# Patient Record
Sex: FEMALE | Race: OTHER
[De-identification: names, ages, dates, MRNs, and addresses within clinical notes are randomized per-mention and may not be internally consistent; named-entity substitution may affect disease eponyms.]

---
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
## 2019-04-04 ENCOUNTER — HOSPITAL ENCOUNTER (EMERGENCY)
Dept: HOSPITAL 72 - EMR | Age: 39
Discharge: HOME | End: 2019-04-04
Payer: MEDICAID

## 2019-04-04 VITALS — DIASTOLIC BLOOD PRESSURE: 90 MMHG | SYSTOLIC BLOOD PRESSURE: 145 MMHG

## 2019-04-04 VITALS — HEIGHT: 60 IN | WEIGHT: 165 LBS | BODY MASS INDEX: 32.39 KG/M2

## 2019-04-04 VITALS — SYSTOLIC BLOOD PRESSURE: 145 MMHG | DIASTOLIC BLOOD PRESSURE: 90 MMHG

## 2019-04-04 DIAGNOSIS — N76.0: ICD-10-CM

## 2019-04-04 DIAGNOSIS — N39.0: Primary | ICD-10-CM

## 2019-04-04 LAB
APPEARANCE UR: CLEAR
APTT PPP: (no result) S
GLUCOSE UR STRIP-MCNC: NEGATIVE MG/DL
KETONES UR QL STRIP: NEGATIVE
LEUKOCYTE ESTERASE UR QL STRIP: (no result)
NITRITE UR QL STRIP: NEGATIVE
PH UR STRIP: 5 [PH] (ref 4.5–8)
PROT UR QL STRIP: NEGATIVE
SP GR UR STRIP: 1.02 (ref 1–1.03)
UROBILINOGEN UR-MCNC: NORMAL MG/DL (ref 0–1)

## 2019-04-04 PROCEDURE — 99283 EMERGENCY DEPT VISIT LOW MDM: CPT

## 2019-04-04 PROCEDURE — 81003 URINALYSIS AUTO W/O SCOPE: CPT

## 2019-04-04 PROCEDURE — 81025 URINE PREGNANCY TEST: CPT

## 2019-04-04 NOTE — EMERGENCY ROOM REPORT
History of Present Illness


General


Chief Complaint:  Female Urogenital Problems


Source:  Patient





Present Illness


HPI


Patient presents with complaints of vaginal discharge increased redness and 

discomfort in the external vaginal area as well





Reports that she had taken some antibiotics about a month ago for her throat





More recently she has had increased vaginal discharge or foul smell


Denies any other abdominal pain denies any fevers or chills she has noticed 

increased erythema as well in the external vaginal region





Patient reports that she had douched, and also taken over-the-counter 

medication without much improvement





Patient reports that she has been sexually active with one partner however does 

not feel that she has an STD


Allergies:  


Coded Allergies:  


     No Known Allergies (Unverified , 4/21/16)





Patient History


Past Medical History:  see triage record


Pertinent Family History:  none


Last Menstrual Period:  3/14/2019


Pregnant Now:  No


Reviewed Nursing Documentation:  PMH: Agreed; PSxH: Agreed





Nursing Documentation-PMH


Past Medical History:  No History, Except For





Review of Systems


All Other Systems:  negative except mentioned in HPI





Physical Exam





Vital Signs








  Date Time  Temp Pulse Resp B/P (MAP) Pulse Ox O2 Delivery O2 Flow Rate FiO2


 


4/4/19 20:45 98.2 83 16 145/90 99 Room Air  








Sp02 EP Interpretation:  reviewed, normal


General Appearance:  well appearing, no apparent distress


Head:  normocephalic, atraumatic


Eyes:  bilateral eye PERRL, bilateral eye EOMI


ENT:  normal pharynx


Neck:  supple


Respiratory:  lungs clear, no retraction, no accessory muscle use


Cardiovascular #1:  regular rate, rhythm


Gastrointestinal:  non tender, soft


Genitourinary:  other - External exam reveals erythema on the labora major and 

minor, grayish discharge vaginally


Musculoskeletal:  normal inspection


Neurologic:  alert, oriented x3


Skin:  other - As above


Lymphatic:  no adenopathy





Medical Decision Making


Diagnostic Impression:  


 Primary Impression:  


 uti


 Additional Impression:  


 vaginitis


ER Course


Patient's clinical exam is consistent with vaginitis also appears to be a yeast 

type component to this,





Patient is treated clinically urine sample also shows evidence of UTI





Patient requires close follow-up with female clinic and further testing


Is otherwise stable for close outpatient follow-up





Labs








Test


  4/4/19


20:50


 


Urine Color Pale yellow 


 


Urine Appearance Clear 


 


Urine pH 5 (4.5-8.0) 


 


Urine Specific Gravity


  1.020


(1.005-1.035)


 


Urine Protein


  Negative


(NEGATIVE)


 


Urine Glucose (UA)


  Negative


(NEGATIVE)


 


Urine Ketones


  Negative


(NEGATIVE)


 


Urine Blood 4+ (NEGATIVE) 


 


Urine Nitrite


  Negative


(NEGATIVE)


 


Urine Bilirubin


  Negative


(NEGATIVE)


 


Urine Urobilinogen


  Normal MG/DL


(0.0-1.0)


 


Urine Leukocyte Esterase 3+ (NEGATIVE) 


 


Urine RBC


  2-4 /HPF (0 -


2)


 


Urine WBC


  5-10 /HPF (0 -


2)


 


Urine Squamous Epithelial


Cells Few /LPF


(NONE/OCC)


 


Urine Bacteria


  Few /HPF


(NONE)


 


Urine HCG, Qualitative


  Negative


(NEGATIVE)











Last Vital Signs








  Date Time  Temp Pulse Resp B/P (MAP) Pulse Ox O2 Delivery O2 Flow Rate FiO2


 


4/4/19 21:58 98.2 79 16 145/90 99 Room Air  








Status:  improved


Disposition:  HOME, SELF-CARE


Condition:  Stable


Scripts


Metronidazole* (METROGEL-VAGINAL*) 70 Gm Gel.w.appl


1 APPL VAGIN EVERY 12 HOURS for 7 Days, #70 GM


   Prov: Elizabeth Langley DO         4/4/19 


Fluconazole (FLUCONAZOLE) 100 Mg Tablet


100 MG ORAL DAILY, #3 TAB 0 Refills


   Prov: Elizabeth Langley DO         4/4/19 


Nitrofurantoin Monohyd/M-Cryst* (MACROBID 100 MG*) 100 Mg Capsule


100 MG ORAL EVERY 12 HOURS for 7 Days, CAP


   Prov: Elizabeth Langley DO         4/4/19


Referrals:  


JAVI MOSES,REFERRING (PCP)











[facility]


Patient Instructions:  Urinary Tract Infection, Vaginitis





Additional Instructions:  


Patient is provided with the discharge instructions notified to follow up with 

primary doctor in the next 2-3 days otherwise return to the er with any 

worsening symptoms.


Please note that this report is being documented using [employer] technology.  This 

can lead to erroneous entry secondary to incorrect interpretation by the 

dictating instrument.











Elizabeth Langley DO Apr 4, 2019 22:05

## 2019-04-04 NOTE — NUR
ED Nurse Note:

Patient has been cleared for discharge. Patient A&Ox4, ambulatory with steady 
gait, no s/s of acute distress. Patient departed with all belongings.

## 2019-04-04 NOTE — NUR
ED Nurse Note:

Patient discharged in stable condition per ERMD. Patient vebalized 
understanding of discharge instructions. ID band removed. Patient discharged to 
persona vehicle departed with all belongings.